# Patient Record
Sex: FEMALE | Race: WHITE
[De-identification: names, ages, dates, MRNs, and addresses within clinical notes are randomized per-mention and may not be internally consistent; named-entity substitution may affect disease eponyms.]

---

## 2021-06-18 ENCOUNTER — HOSPITAL ENCOUNTER (EMERGENCY)
Dept: HOSPITAL 54 - ER | Age: 26
Discharge: HOME | End: 2021-06-18
Payer: COMMERCIAL

## 2021-06-18 VITALS — BODY MASS INDEX: 25.61 KG/M2 | HEIGHT: 64 IN | WEIGHT: 150 LBS

## 2021-06-18 VITALS — SYSTOLIC BLOOD PRESSURE: 128 MMHG | DIASTOLIC BLOOD PRESSURE: 79 MMHG
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
## 2021-06-18 DIAGNOSIS — S82.52XA: Primary | ICD-10-CM
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
## 2021-06-18 NOTE — NUR
EMT AT THE BEDSIDE, PROVIDED PT WITH SHORT LEG POSTERIOR WITH STIRRUP SPLINT 
AND CRUTCHES. TEACHING ON HOW TO USE THE CRUTCHES WAS PROVIDED WITH 
DEMONSTRATION.
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
## 2021-06-18 NOTE — NUR
Patient discharged to home in stable condition. Rx AND Written and verbal after 
care instructions given. Patient verbalizes understanding of instruction. 
Crutches dispensed. Pt instructed on proper use of crutches. Patient able to 
demonstrate correct use of crutches.